# Patient Record
Sex: MALE | Race: WHITE | Employment: FULL TIME | URBAN - METROPOLITAN AREA
[De-identification: names, ages, dates, MRNs, and addresses within clinical notes are randomized per-mention and may not be internally consistent; named-entity substitution may affect disease eponyms.]

---

## 2018-06-05 ENCOUNTER — APPOINTMENT (OUTPATIENT)
Dept: GENERAL RADIOLOGY | Facility: CLINIC | Age: 33
End: 2018-06-05
Attending: EMERGENCY MEDICINE
Payer: COMMERCIAL

## 2018-06-05 ENCOUNTER — HOSPITAL ENCOUNTER (EMERGENCY)
Facility: CLINIC | Age: 33
Discharge: HOME | End: 2018-06-06
Attending: EMERGENCY MEDICINE | Admitting: EMERGENCY MEDICINE
Payer: COMMERCIAL

## 2018-06-05 VITALS
DIASTOLIC BLOOD PRESSURE: 99 MMHG | TEMPERATURE: 98.6 F | OXYGEN SATURATION: 96 % | SYSTOLIC BLOOD PRESSURE: 140 MMHG | HEART RATE: 109 BPM

## 2018-06-05 DIAGNOSIS — S43.005A SHOULDER DISLOCATION, LEFT, INITIAL ENCOUNTER: ICD-10-CM

## 2018-06-05 PROCEDURE — 23650 CLTX SHO DSLC W/MNPJ WO ANES: CPT | Mod: LT

## 2018-06-05 PROCEDURE — 25000125 ZZHC RX 250: Performed by: EMERGENCY MEDICINE

## 2018-06-05 PROCEDURE — 73030 X-RAY EXAM OF SHOULDER: CPT | Mod: LT

## 2018-06-05 PROCEDURE — 96376 TX/PRO/DX INJ SAME DRUG ADON: CPT

## 2018-06-05 PROCEDURE — 99285 EMERGENCY DEPT VISIT HI MDM: CPT | Mod: 25

## 2018-06-05 PROCEDURE — 96374 THER/PROPH/DIAG INJ IV PUSH: CPT

## 2018-06-05 PROCEDURE — 25000128 H RX IP 250 OP 636: Performed by: EMERGENCY MEDICINE

## 2018-06-05 PROCEDURE — 40000986 XR SHOULDER 2 VIEW LEFT: Mod: LT

## 2018-06-05 RX ORDER — ONDANSETRON 4 MG/1
4 TABLET, ORALLY DISINTEGRATING ORAL ONCE
Status: COMPLETED | OUTPATIENT
Start: 2018-06-05 | End: 2018-06-05

## 2018-06-05 RX ORDER — IBUPROFEN 600 MG/1
600 TABLET, FILM COATED ORAL EVERY 6 HOURS PRN
Qty: 60 TABLET | Refills: 0 | Status: SHIPPED | OUTPATIENT
Start: 2018-06-05

## 2018-06-05 RX ORDER — HYDROMORPHONE HYDROCHLORIDE 1 MG/ML
0.5 INJECTION, SOLUTION INTRAMUSCULAR; INTRAVENOUS; SUBCUTANEOUS
Status: DISCONTINUED | OUTPATIENT
Start: 2018-06-05 | End: 2018-06-06 | Stop reason: HOSPADM

## 2018-06-05 RX ORDER — OXYCODONE HYDROCHLORIDE 5 MG/1
5 TABLET ORAL EVERY 6 HOURS PRN
Qty: 6 TABLET | Refills: 0 | Status: SHIPPED | OUTPATIENT
Start: 2018-06-05

## 2018-06-05 RX ADMIN — ONDANSETRON 4 MG: 4 TABLET, ORALLY DISINTEGRATING ORAL at 23:52

## 2018-06-05 RX ADMIN — HYDROMORPHONE HYDROCHLORIDE 0.5 MG: 1 INJECTION, SOLUTION INTRAMUSCULAR; INTRAVENOUS; SUBCUTANEOUS at 21:12

## 2018-06-05 RX ADMIN — HYDROMORPHONE HYDROCHLORIDE 0.5 MG: 1 INJECTION, SOLUTION INTRAMUSCULAR; INTRAVENOUS; SUBCUTANEOUS at 20:09

## 2018-06-05 ASSESSMENT — ENCOUNTER SYMPTOMS: ARTHRALGIAS: 1

## 2018-06-05 NOTE — ED AVS SNAPSHOT
[facility] Emergency Department    [address]    [facility] [address]    Phone:  [phone]    Fax:  [phone]                                       Lex Rodrigues   MRN: [id number]    Department:  [facility] Emergency Department   Date of Visit:  6/5/2018           After Visit Summary Signature Page     I have received my discharge instructions, and my questions have been answered. I have discussed any challenges I see with this plan with the nurse or doctor.    ..........................................................................................................................................  Patient/Patient Representative Signature      ..........................................................................................................................................  Patient Representative Print Name and Relationship to Patient    ..................................................               ................................................  Date                                            Time    ..........................................................................................................................................  Reviewed by Signature/Title    ...................................................              ..............................................  Date                                                            Time

## 2018-06-05 NOTE — ED AVS SNAPSHOT
[facility] Emergency Department    [address]    [facility] [address]    Phone:  [phone]    Fax:  [phone]                                       Lex Rodrigues   MRN: [id number]    Department:  [facility] Emergency Department   Date of Visit:  6/5/2018           Patient Information     Date Of Birth          1985        Your diagnoses for this visit were:     Shoulder dislocation, left, initial encounter        You were seen by Regine Castro MD.      Follow-up Information     [facility]. Schedule an appointment as soon as possible for a visit in 1 week.    Contact information:    [address]  [address]  [facility] [address] [phone]        Discharge Instructions       *Wear shoulder immobilizer as directed.   *Take medications as prescribed.  Ibuprofen and/or tylenol for pain.  Oxycodone for severe pain not relieved by ibuprofen.   *Follow-up with orthopedics in the next 2-3 days.  *Return if you develop worsening pain, numbness, weakness or become worse in any way.    Home  Back  SP    Dislocation: Shoulder (Reduced)    Dislocation of the shoulder joint occurs when a strong force tears the ligaments holding the joint together. This allows the bones to move apart and become stuck out of place. Once the joint is aligned again, it will take about six weeks for the ligaments to heal. Since this injury may weaken the ligaments, you are at risk of another dislocation with less force. Therefore, care should be taken to avoid a similar injury in the future.  Shoulder dislocation is treated with a shoulder immobilizer (special type of arm sling). This keeps your arm close to your body to prevent a recurrent dislocation while the ligaments heal. After a few weeks, an exercise program may be started. This will gradually restore range of motion and strength at the shoulder and decrease the risk of another  dislocation.  Home Care:    Until your next doctor visit, wear your shoulder immobilizer at all times . Do not take it off at night to sleep. It is possible to dislocate your arm again in your sleep. You may take it off to bathe or dress, but do not move your arm away from your body. Keep your arm in the same position that the sling was holding it in, until you reapply the sling again. During your next visit, ask your doctor how long you should wear the sling.    Apply an ice pack (ice cubes in a plastic bag, wrapped in a towel) over the injured area for 20 minutes every 1-2 hours the first day. Continue with ice packs 3-4 times a day for the next two days, then as needed for the relief of pain and swelling.    You may use acetaminophen (Tylenol) or ibuprofen (Motrin, Advil) to control pain, unless another pain medicine was prescribed. [NOTE: If you have chronic liver or kidney disease or ever had a stomach ulcer or GI bleeding, talk with your doctor before using these medicines.]    No sports or P.E. until cleared by your doctor.  Follow Up  with your doctor within one week or as advised by our staff. Shoulder immobilizers and slings should not be worn continuously for more than a few weeks or you may lose some range-of-motion at the shoulder joint. If you have had repeated dislocations of the same shoulder, that means there has been permanent ligament damage. Ask the orthopedic doctor about surgery to prevent another dislocation.  Get Prompt Medical Attention  if any of the following occur:    Another dislocation of your shoulder    Increasing swelling or pain in the shoulder or arm    Fingers become cold, blue, numb or tingly    [id number] [address], [place] [address]. All rights reserved. This information is not intended as a substitute for professional medical care. Always follow your healthcare professional's instructions.      Opioid Medication Information    You have been given a  prescription for an opioid (narcotic) pain medicine and/or have received a pain medicine while here in the Emergency Department. These medicines can make you drowsy or impaired. You must not drive, operate dangerous equipment, or engage in any other dangerous activities while taking these medications. If you drive while taking these medications, you could be arrested for DUI, or driving under the influence. Do not drink any alcohol while you are taking these medications.   Opioid pain medications can cause addiction. If you have a history of chemical dependency of any type, you are at a higher risk of becoming addicted to pain medications.  Only take these prescribed medications to treat your pain when all other options have been tried. Take it for as short a time and as few doses as possible. Store your pain pills in a secure place, as they are frequently stolen and provide a dangerous opportunity for children or visitors in your house to start abusing these powerful medications. We will not replace any lost or stolen medicine.  As soon as your pain is better, you should flush all your remaining medication.   Many prescription pain medications contain Tylenol  (acetaminophen), including Vicodin , Tylenol #3 , Norco , Lortab , and Percocet .  You should not take any extra pills of Tylenol  if you are using these prescription medications or you can get very sick.  Do not ever take more than 4000 mg of acetaminophen in any 24 hour period.  All opioids tend to cause constipation. Drink plenty of water and eat foods that have a lot of fiber, such as fruits, vegetables, prune juice, apple juice and high fiber cereal.  Take a laxative if you don t move your bowels at least every other day. Miralax , Milk of Magnesia, Colace , or Senna  can be used to keep you regular.                24 Hour Appointment Hotline       To make an appointment at any [facility], call [phone] ([phone]). If you don't have a  family doctor or clinic, we will help you find one. [place] clinics are conveniently located to serve the needs of you and your family.             Review of your medicines      START taking        Dose / Directions Last dose taken    ibuprofen 600 MG tablet   Commonly known as:  ADVIL/MOTRIN   Dose:  600 mg   Quantity:  60 tablet        Take 1 tablet (600 mg) by mouth every 6 hours as needed for moderate pain   Refills:  0        oxyCODONE IR 5 MG tablet   Commonly known as:  ROXICODONE   Dose:  5 mg   Quantity:  6 tablet        Take 1 tablet (5 mg) by mouth every 6 hours as needed for pain   Refills:  0          Our records show that you are taking the medicines listed below. If these are incorrect, please call your family doctor or clinic.        Dose / Directions Last dose taken    DAILY MULTIVITAMIN PO        Take  by mouth.   Refills:  0                Information about OPIOIDS     PRESCRIPTION OPIOIDS: WHAT YOU NEED TO KNOW   You have a prescription for an opioid (narcotic) pain medicine. Opioids can cause addiction. If you have a history of chemical dependency of any type, you are at a higher risk of becoming addicted to opioids. Only take this medicine after all other options have been tried. Take it for as short a time and as few doses as possible.     Do not:    Drive. If you drive while taking these medicines, you could be arrested for driving under the influence (DUI).    Operate heavy machinery    Do any other dangerous activities while taking these medicines.     Drink any alcohol while taking these medicines.      Take with any other medicines that contain acetaminophen. Read all labels carefully. Look for the word  acetaminophen  or  Tylenol.  Ask your pharmacist if you have questions or are unsure.    Store your pills in a secure place, locked if possible. We will not replace any lost or stolen medicine. If you don t finish your medicine, please throw away (dispose) as directed by your pharmacist.  The [place] Pollution Control Agency has more information about safe disposal: https://www.pca.[facility].[place]/living-green/managing-unwanted-medications    All opioids tend to cause constipation. Drink plenty of water and eat foods that have a lot of fiber, such as fruits, vegetables, prune juice, apple juice and high-fiber cereal. Take a laxative (Miralax, milk of magnesia, Colace, Senna) if you don t move your bowels at least every other day.         Prescriptions were sent or printed at these locations (2 Prescriptions)                   Other Prescriptions                Printed at Department/Unit printer (2 of 2)         ibuprofen (ADVIL/MOTRIN) 600 MG tablet               oxyCODONE IR (ROXICODONE) 5 MG tablet                Procedures and tests performed during your visit     Procedure/Test Number of Times Performed    XR Shoulder Left 2 Views 2      Orders Needing Specimen Collection     None      Pending Results     No orders found from 6/3/2018 to 6/6/2018.            Pending Culture Results     No orders found from 6/3/2018 to 6/6/2018.            Pending Results Instructions     If you had any lab results that were not finalized at the time of your Discharge, you can call the ED Lab Result RN at [phone]. You will be contacted by this team for any positive Lab results or changes in treatment. The nurses are available 7 days a week from 10A to 6:30P.  You can leave a message 24 hours per day and they will return your call.        Test Results From Your Hospital Stay              6/5/2018  8:59 PM      Narrative     SHOULDER TWO VIEW LEFT   6/5/2018 8:51 PM     HISTORY: Shoulder pain, suspect dislocation.    COMPARISON: None.    FINDINGS: Anterior dislocation left humeral head. No fracture  identified.        Impression     IMPRESSION: Anterior dislocation left shoulder.    RAMON MASTERS MD               6/5/2018 10:53 PM      Narrative     LEFT SHOULDER TWO VIEWS   6/5/2018 10:12 PM     HISTORY:  Postreduction.    COMPARISON: 6/5/2018 at 2043 hours.        Impression     IMPRESSION:   1. Interval reduction of what was an anterior dislocation of the left  glenohumeral joint.  2. No visualized acute fracture of the left shoulder.    RAJESH GEIGER MD                Clinical Quality Measure: Blood Pressure Screening     Your blood pressure was checked while you were in the emergency department today. The last reading we obtained was  BP: (!) 140/99 . Please read the guidelines below about what these numbers mean and what you should do about them.  If your systolic blood pressure (the top number) is less than 120 and your diastolic blood pressure (the bottom number) is less than 80, then your blood pressure is normal. There is nothing more that you need to do about it.  If your systolic blood pressure (the top number) is 120-139 or your diastolic blood pressure (the bottom number) is 80-89, your blood pressure may be higher than it should be. You should have your blood pressure rechecked within a year by a primary care provider.  If your systolic blood pressure (the top number) is 140 or greater or your diastolic blood pressure (the bottom number) is 90 or greater, you may have high blood pressure. High blood pressure is treatable, but if left untreated over time it can put you at risk for heart attack, stroke, or kidney failure. You should have your blood pressure rechecked by a primary care provider within the next 4 weeks.  If your provider in the emergency department today gave you specific instructions to follow-up with your doctor or provider even sooner than that, you should follow that instruction and not wait for up to 4 weeks for your follow-up visit.        Thank you for choosing [facility]       Thank you for choosing [facility] for your care. Our goal is always to provide you with excellent care. Hearing back from our patients is one way we can continue to improve our services. Please take a few  minutes to complete the written survey that you may receive in the mail after you visit with us. Thank you!        [employer] Information     [employer] gives you secure access to your electronic health record. If you see a primary care provider, you can also send messages to your care team and make appointments. If you have questions, please call your primary care clinic.  If you do not have a primary care provider, please call [phone] and they will assist you.        Care EveryWhere ID     This is your Care EveryWhere ID. This could be used by other organizations to access your [facility] medical records  [id number]        Equal Access to Services     [facility] : Samantha Jacobo, ilia grace, roddy rae, james vargas. So [facility] [phone].    ATENCIÓN: Si habla español, tiene a evans disposición servicios gratuitos de asistencia lingüística. Llame al [phone].    We comply with applicable federal civil rights laws and [place] laws. We do not discriminate on the basis of race, color, national origin, age, disability, sex, sexual orientation, or gender identity.            After Visit Summary       This is your record. Keep this with you and show to your community pharmacist(s) and doctor(s) at your next visit.

## 2018-06-05 NOTE — ED AVS SNAPSHOT
[facility] Emergency Department    [address]    [facility] [address]    Phone:  [phone]    Fax:  [phone]                                       Lex Rodrigues   MRN: [id number]    Department:  [facility] Emergency Department   Date of Visit:  6/5/2018           Patient Information     Date Of Birth          1985        Your diagnoses for this visit were:     Shoulder dislocation, left, initial encounter        You were seen by Regine Castro MD.      Follow-up Information     [facility] Schedule an appointment as soon as possible for a visit in 1 week.    Contact information:    [address]  [address]  [facility] [address] [phone]        Discharge Instructions       *Wear shoulder immobilizer as directed.   *Take medications as prescribed.  Ibuprofen and/or tylenol for pain.  Oxycodone for severe pain not relieved by ibuprofen.   *Follow-up with orthopedics in the next 2-3 days.  *Return if you develop worsening pain, numbness, weakness or become worse in any way.    Home  Back  SP    Dislocation: Shoulder (Reduced)    Dislocation of the shoulder joint occurs when a strong force tears the ligaments holding the joint together. This allows the bones to move apart and become stuck out of place. Once the joint is aligned again, it will take about six weeks for the ligaments to heal. Since this injury may weaken the ligaments, you are at risk of another dislocation with less force. Therefore, care should be taken to avoid a similar injury in the future.  Shoulder dislocation is treated with a shoulder immobilizer (special type of arm sling). This keeps your arm close to your body to prevent a recurrent dislocation while the ligaments heal. After a few weeks, an exercise program may be started. This will gradually restore range of motion and strength at the shoulder and decrease the risk of another  dislocation.  Home Care:    Until your next doctor visit, wear your shoulder immobilizer at all times . Do not take it off at night to sleep. It is possible to dislocate your arm again in your sleep. You may take it off to bathe or dress, but do not move your arm away from your body. Keep your arm in the same position that the sling was holding it in, until you reapply the sling again. During your next visit, ask your doctor how long you should wear the sling.    Apply an ice pack (ice cubes in a plastic bag, wrapped in a towel) over the injured area for 20 minutes every 1-2 hours the first day. Continue with ice packs 3-4 times a day for the next two days, then as needed for the relief of pain and swelling.    You may use acetaminophen (Tylenol) or ibuprofen (Motrin, Advil) to control pain, unless another pain medicine was prescribed. [NOTE: If you have chronic liver or kidney disease or ever had a stomach ulcer or GI bleeding, talk with your doctor before using these medicines.]    No sports or P.E. until cleared by your doctor.  Follow Up  with your doctor within one week or as advised by our staff. Shoulder immobilizers and slings should not be worn continuously for more than a few weeks or you may lose some range-of-motion at the shoulder joint. If you have had repeated dislocations of the same shoulder, that means there has been permanent ligament damage. Ask the orthopedic doctor about surgery to prevent another dislocation.  Get Prompt Medical Attention  if any of the following occur:    Another dislocation of your shoulder    Increasing swelling or pain in the shoulder or arm    Fingers become cold, blue, numb or tingly    [address]75-[address], [place] [address]. All rights reserved. This information is not intended as a substitute for professional medical care. Always follow your healthcare professional's instructions.      Opioid Medication Information    You have been given a  prescription for an opioid (narcotic) pain medicine and/or have received a pain medicine while here in the Emergency Department. These medicines can make you drowsy or impaired. You must not drive, operate dangerous equipment, or engage in any other dangerous activities while taking these medications. If you drive while taking these medications, you could be arrested for DUI, or driving under the influence. Do not drink any alcohol while you are taking these medications.   Opioid pain medications can cause addiction. If you have a history of chemical dependency of any type, you are at a higher risk of becoming addicted to pain medications.  Only take these prescribed medications to treat your pain when all other options have been tried. Take it for as short a time and as few doses as possible. Store your pain pills in a secure place, as they are frequently stolen and provide a dangerous opportunity for children or visitors in your house to start abusing these powerful medications. We will not replace any lost or stolen medicine.  As soon as your pain is better, you should flush all your remaining medication.   Many prescription pain medications contain Tylenol  (acetaminophen), including Vicodin , Tylenol #3 , Norco , Lortab , and Percocet .  You should not take any extra pills of Tylenol  if you are using these prescription medications or you can get very sick.  Do not ever take more than 4000 mg of acetaminophen in any 24 hour period.  All opioids tend to cause constipation. Drink plenty of water and eat foods that have a lot of fiber, such as fruits, vegetables, prune juice, apple juice and high fiber cereal.  Take a laxative if you don t move your bowels at least every other day. Miralax , Milk of Magnesia, Colace , or Senna  can be used to keep you regular.                [facility] Scheduling Hotline     To schedule an appointment at [place] [facility] [place], please call [phone]. If you don't have a family doctor  or clinic, we will help you find one. [facility] clinics are conveniently located to serve the needs of you and your family.           Review of your medicines      START taking        Dose / Directions Last dose taken    ibuprofen 600 MG tablet   Commonly known as:  ADVIL/MOTRIN   Dose:  600 mg   Quantity:  60 tablet        Take 1 tablet (600 mg) by mouth every 6 hours as needed for moderate pain   Refills:  0        oxyCODONE IR 5 MG tablet   Commonly known as:  ROXICODONE   Dose:  5 mg   Quantity:  6 tablet        Take 1 tablet (5 mg) by mouth every 6 hours as needed for pain   Refills:  0          Our records show that you are taking the medicines listed below. If these are incorrect, please call your family doctor or clinic.        Dose / Directions Last dose taken    DAILY MULTIVITAMIN PO        Take  by mouth.   Refills:  0                Information about OPIOIDS     PRESCRIPTION OPIOIDS: WHAT YOU NEED TO KNOW   You have a prescription for an opioid (narcotic) pain medicine. Opioids can cause addiction. If you have a history of chemical dependency of any type, you are at a higher risk of becoming addicted to opioids. Only take this medicine after all other options have been tried. Take it for as short a time and as few doses as possible.     Do not:    Drive. If you drive while taking these medicines, you could be arrested for driving under the influence (DUI).    Operate heavy machinery    Do any other dangerous activities while taking these medicines.     Drink any alcohol while taking these medicines.      Take with any other medicines that contain acetaminophen. Read all labels carefully. Look for the word  acetaminophen  or  Tylenol.  Ask your pharmacist if you have questions or are unsure.    Store your pills in a secure place, locked if possible. We will not replace any lost or stolen medicine. If you don t finish your medicine, please throw away (dispose) as directed by your pharmacist. The [place]  Pollution Control Agency has more information about safe disposal: https://www.pca.[facility].[place].us/living-green/managing-unwanted-medications    All opioids tend to cause constipation. Drink plenty of water and eat foods that have a lot of fiber, such as fruits, vegetables, prune juice, apple juice and high-fiber cereal. Take a laxative (Miralax, milk of magnesia, Colace, Senna) if you don t move your bowels at least every other day.         Prescriptions were sent or printed at these locations (2 Prescriptions)                   Other Prescriptions                Printed at Department/Unit printer (2 of 2)         ibuprofen (ADVIL/MOTRIN) 600 MG tablet               oxyCODONE IR (ROXICODONE) 5 MG tablet                Procedures and tests performed during your visit     Procedure/Test Number of Times Performed    XR Shoulder Left 2 Views 2      Orders Needing Specimen Collection     None      Pending Results     No orders found from 6/3/2018 to 6/6/2018.            Pending Culture Results     No orders found from 6/3/2018 to 6/6/2018.            Pending Results Instructions     If you had any lab results that were not finalized at the time of your Discharge, you can call the ED Lab Result RN at [phone]. You will be contacted by this team for any positive Lab results or changes in treatment. The nurses are available 7 days a week from 10A to 6:30P.  You can leave a message 24 hours per day and they will return your call.        Test Results From Your Hospital Stay              6/5/2018  8:59 PM      Narrative     SHOULDER TWO VIEW LEFT   6/5/2018 8:51 PM     HISTORY: Shoulder pain, suspect dislocation.    COMPARISON: None.    FINDINGS: Anterior dislocation left humeral head. No fracture  identified.        Impression     IMPRESSION: Anterior dislocation left shoulder.    RAMON MASTERS MD               6/5/2018 10:53 PM      Narrative     LEFT SHOULDER TWO VIEWS   6/5/2018 10:12 PM     HISTORY:  Postreduction.    COMPARISON: 6/5/2018 at 2043 hours.        Impression     IMPRESSION:   1. Interval reduction of what was an anterior dislocation of the left  glenohumeral joint.  2. No visualized acute fracture of the left shoulder.    RAJESH GEIGER MD                Clinical Quality Measure: Blood Pressure Screening     Your blood pressure was checked while you were in the emergency department today. The last reading we obtained was  BP: (!) 140/99 . Please read the guidelines below about what these numbers mean and what you should do about them.  If your systolic blood pressure (the top number) is less than 120 and your diastolic blood pressure (the bottom number) is less than 80, then your blood pressure is normal. There is nothing more that you need to do about it.  If your systolic blood pressure (the top number) is 120-139 or your diastolic blood pressure (the bottom number) is 80-89, your blood pressure may be higher than it should be. You should have your blood pressure rechecked within a year by a primary care provider.  If your systolic blood pressure (the top number) is 140 or greater or your diastolic blood pressure (the bottom number) is 90 or greater, you may have high blood pressure. High blood pressure is treatable, but if left untreated over time it can put you at risk for heart attack, stroke, or kidney failure. You should have your blood pressure rechecked by a primary care provider within the next 4 weeks.  If your provider in the emergency department today gave you specific instructions to follow-up with your doctor or provider even sooner than that, you should follow that instruction and not wait for up to 4 weeks for your follow-up visit.        Thank you for choosing [facility]       Thank you for choosing [facility] for your care. Our goal is always to provide you with excellent care. Hearing back from our patients is one way we can continue to improve our services. Please take a few  minutes to complete the written survey that you may receive in the mail after you visit with us. Thank you!        [employer] Information     [employer] gives you secure access to your electronic health record. If you see a primary care provider, you can also send messages to your care team and make appointments. If you have questions, please call your primary care clinic.  If you do not have a primary care provider, please call [phone] and they will assist you.        Care EveryWhere ID     This is your Care EveryWhere ID. This could be used by other organizations to access your [place] medical records  [id number]        Equal Access to Services     [facility] : Samantha Jacobo, ilia grace, roddy rae, james vargas. So [facility] [phone].    ATENCIÓN: Si habla español, tiene a evans disposición servicios gratuitos de asistencia lingüística. Llame al [phone].    We comply with applicable federal civil rights laws and [place] laws. We do not discriminate on the basis of race, color, national origin, age, disability, sex, sexual orientation, or gender identity.            After Visit Summary       This is your record. Keep this with you and show to your community pharmacist(s) and doctor(s) at your next visit.

## 2018-06-06 NOTE — ED NOTES
Patient c/o some nausea after ambulating to bathroom. MD made aware. Orders for Zofran received verbally.

## 2018-06-06 NOTE — ED TRIAGE NOTES
Pt presents to ED with complaints of left shoulder dislocation after an accident on a blow up water slide. Pt went to urgent care and shoulder went back in then pt left and waved to his son and it fell back out of socket again. CMS intact. ABCs intact. A&Ox3. VSS.

## 2018-06-06 NOTE — ED PROVIDER NOTES
History     Chief Complaint:  Shoulder Pain    HPI   Lex Rodrigues is a 32 year old male who presents today for evaluation of shoulder pain. The patient reports that he was going down an inflatable water slide earlier this evening, when his arm got stuck in a strap and he felt his left shoulder dislocate. His neighbor then brought him to the [facility], but he was sent to here for evaluation. While he was leaving urgent care, his shoulder went back into place while buckling his seatbelt, so he returned home. While at home, he waved to his son and felt his shoulder dislocate again, which prompted his visit to the ED today. The patient stated that he had dislocated his elbow multiple times previously, with the first being about 10 years ago. He has never needed to be sedated for this in the past. He denies any other complaints today.    Allergies:  NKDA    Medications:    The patient denies any significant past medical history.    Past Medical History:    Hyperlipidemia  Hypertension    Past Surgical History:    The patient does not have any pertinent past surgical history.    Family History:    Mother: kin-melanoma    Social History:  Negative for tobacco use.  Consumes about 6 drinks a week.  Marital Status:   [2]    Review of Systems   Musculoskeletal: Positive for arthralgias.   All other systems reviewed and are negative.    Physical Exam     Patient Vitals for the past 24 hrs:   BP Temp Temp src Pulse Heart Rate SpO2   06/05/18 2120 - - - - - 96 %   06/05/18 2030 - - - - - 96 %   06/05/18 2020 (!) 147/107 - - - - 97 %   06/05/18 2009 (!) 149/115 - - - - 97 %   06/05/18 1959 (!) 157/118 - - - - 98 %   06/05/18 1951 - 98.6  F (37  C) Oral 109 109 96 %     Physical Exam  General: Well-nourished, appears to be in pain  Eyes: PERRL, conjunctivae pink no scleral icterus or conjunctival injection  ENT:  Moist mucus membranes, posterior oropharynx clear without erythema or exudates  Respiratory:   Lungs clear to auscultation bilaterally, no crackles/rubs/wheezes.  Good air movement  CV: Normal rate and rhythm, no murmurs/rubs/gallops.  Normal, symmetric radial pulse.  Normal distal capillary refill.  Normal distal hand temperature.  GI:  Abdomen soft and non-distended.  Normoactive BS.  No tenderness, guarding or rebound  Skin: Warm, dry.  No rashes or petechiae  Musculoskeletal: Left shoulder with divot and tenderness, appears to be dislocated.    Neuro: Alert and oriented to person/place/time.  Normal deltoid and distal sensation to light touch.  Psychiatric: Normal affect      Emergency Department Course     Imaging:  Radiographic findings were communicated with the patient who voiced understanding of the findings.  XR Shoulder 2 views, 2058:   Anterior dislocation left shoulder as per radiology.     XR Shoulder 2 views, 2252:   1. Interval reduction of what was an anterior dislocation of the left  glenohumeral joint.  2. No visualized acute fracture of the left shoulder, as per radiology.     Procedures:   Narrative: Procedure: Reduction       Location: Left shoulder     Consent:  Risks, benefits and alternatives were discussed with patient and verbal consent for procedure was obtained.     Timeout:  Universal protocol was followed. TIME OUT conducted just prior to starting procedure confirmed patient identity, site/side, procedure, patient position, and availability of correct equipment and implants? Yes      Medication:  Dilaudid IV     Procedure Note:  Reduced joint using the Davos reduction method with sudden clunk and patient felt shoulder back in place.  No divot noted and anatomy appeared normal.  Normal distal NV intact after procedure.           Patient Status:  Patient tolerated the procedure well.  There were no complications.     Interventions:  2009 Dilaudid 0.5 mg IV  2112 Dilaudid 0.5 mg IV  2352 Zofran 4mg PO    Emergency Department Course:  Nursing notes and vitals reviewed. (1947) I  performed an exam of the patient as documented above.     IV inserted. Medicine administered as documented above.    The patient was sent for a shoulder XR while in the emergency department, findings above.     (2100) I rechecked the patient and discussed the results of his workup thus far.     (2130)  I performed a reduction procedure as noted above.     Findings and plan explained to the Patient. Patient discharged home with instructions regarding supportive care, medications, and reasons to return. The importance of close follow-up was reviewed. The patient was prescribed Ibuprofen, Oxycodone.    I personally reviewed the laboratory results with the Patient and answered all related questions prior to discharge.     Impression & Plan      Medical Decision Making:  Lex Rodrigues is a 32 year old male who presents for evaluation of shoulder pain after an accident.  History, physical exam and radiological exam are consistent with anteriior shoulder dislocation. The dislocation was successfully reduced and a shoulder immobilizer was placed by myself, the fit was checked.  The neurovascular exam is normal pre and post-reduction.  I will have patient stay in the immobilizer until follow-up with orthopedics in 3-5 days.  The patient was dizzy following reduction, I suspect from dilaudid but gradually improved and ambulated without difficulty and was comfortable with discharge home with ortho follow-up.    Diagnosis:    ICD-10-CM   1. Shoulder dislocation, left, initial encounter S43.005A     Disposition:  discharged to home    Discharge Medications:   Details   ibuprofen (ADVIL/MOTRIN) 600 MG tablet Take 1 tablet (600 mg) by mouth every 6 hours as needed for moderate pain, Disp-60 tablet, R-0, Local Print      oxyCODONE IR (ROXICODONE) 5 MG tablet Take 1 tablet (5 mg) by mouth every 6 hours as needed for pain, Disp-6 tablet, R-0, Local Print        Scribe Disclosure:  I,  Markus Gray, am serving as a scribe on  6/5/2018 at 7:47 PM to personally document services performed by Regine Castro MD based on my observations and the provider's statements to me.     Markus Gray  6/5/2018   [facility] EMERGENCY DEPARTMENT       Regine Castro MD  06/06/18 0105

## 2019-09-29 ENCOUNTER — HEALTH MAINTENANCE LETTER (OUTPATIENT)
Age: 34
End: 2019-09-29

## 2021-01-14 ENCOUNTER — HEALTH MAINTENANCE LETTER (OUTPATIENT)
Age: 36
End: 2021-01-14

## 2021-10-23 ENCOUNTER — HEALTH MAINTENANCE LETTER (OUTPATIENT)
Age: 36
End: 2021-10-23

## 2022-02-12 ENCOUNTER — HEALTH MAINTENANCE LETTER (OUTPATIENT)
Age: 37
End: 2022-02-12

## 2022-10-10 ENCOUNTER — HEALTH MAINTENANCE LETTER (OUTPATIENT)
Age: 37
End: 2022-10-10

## 2023-03-25 ENCOUNTER — HEALTH MAINTENANCE LETTER (OUTPATIENT)
Age: 38
End: 2023-03-25

## 2023-11-02 ENCOUNTER — NURSE TRIAGE (OUTPATIENT)
Dept: NURSING | Facility: CLINIC | Age: 38
End: 2023-11-02
Payer: COMMERCIAL

## 2023-11-02 NOTE — TELEPHONE ENCOUNTER
"Spoke with patient's wife, Ginny, consent to communicate received verbally from patient.     Nurse Triage SBAR    Is this a 2nd Level Triage? NO    Situation: Abdominal pain    Background: Lex has severe abdominal pain, usually doesn't complain of pain. Having chills, but afebrile. Unable to stand. Pain started immediately after dinner, has progressively gotten worse.     Assessment: Abdominal pain rated at 6 on scale of 0-10; if he sits up the pain is at 9 on scale of 0-10. Pain is the worst in lower middle abdomen but spans the middle abdomen.Pain is worse with standing; reports it changed to 8 or 9 and migrated into groin. Pain feels like \"tightness\". No distension of the abdomen. Normal urination. Last BM this morning, it was normal. No difficulty breathing.     Protocol Recommended Disposition:   Go to ED Now    Recommendation: Patient should be evaluated in the emergency department. Patient's wife will drive him if she is able to get him into the car; if spouse is unable to get patient into the car she will call 911. Reviewed care advice. No further questions or concerns at this time.      Milly Velez RN on 11/2/2023 at 12:55 AM      Reason for Disposition   [1] SEVERE pain (e.g., excruciating) AND [2] present > 1 hour    Additional Information   Negative: Chest pain   Negative: Pain is mainly in upper abdomen  (if needed ask: \"is it mainly above the belly button?\")   Negative: Followed an abdomen (stomach) injury   Negative: Abdomen bloating or swelling are main symptoms   Negative: Shock suspected (e.g., cold/pale/clammy skin, too weak to stand, low BP, rapid pulse)   Negative: Difficult to awaken or acting confused (e.g., disoriented, slurred speech)   Negative: Passed out (i.e., lost consciousness, collapsed and was not responding)   Negative: Sounds like a life-threatening emergency to the triager    Protocols used: Abdominal Pain - Male-A-AH    "

## 2024-05-26 ENCOUNTER — HEALTH MAINTENANCE LETTER (OUTPATIENT)
Age: 39
End: 2024-05-26